# Patient Record
Sex: FEMALE | Race: OTHER | HISPANIC OR LATINO
[De-identification: names, ages, dates, MRNs, and addresses within clinical notes are randomized per-mention and may not be internally consistent; named-entity substitution may affect disease eponyms.]

---

## 2018-11-18 ENCOUNTER — TRANSCRIPTION ENCOUNTER (OUTPATIENT)
Age: 63
End: 2018-11-18

## 2019-09-29 ENCOUNTER — TRANSCRIPTION ENCOUNTER (OUTPATIENT)
Age: 64
End: 2019-09-29

## 2020-04-26 ENCOUNTER — MESSAGE (OUTPATIENT)
Age: 65
End: 2020-04-26

## 2020-05-12 ENCOUNTER — APPOINTMENT (OUTPATIENT)
Dept: DISASTER EMERGENCY | Facility: HOSPITAL | Age: 65
End: 2020-05-12

## 2020-05-12 LAB
SARS-COV-2 IGG SERPL IA-ACNC: 1.4 INDEX
SARS-COV-2 IGG SERPL QL IA: POSITIVE

## 2020-11-06 ENCOUNTER — TRANSCRIPTION ENCOUNTER (OUTPATIENT)
Age: 65
End: 2020-11-06

## 2021-08-16 ENCOUNTER — TRANSCRIPTION ENCOUNTER (OUTPATIENT)
Age: 66
End: 2021-08-16

## 2021-11-04 ENCOUNTER — TRANSCRIPTION ENCOUNTER (OUTPATIENT)
Age: 66
End: 2021-11-04

## 2022-03-21 PROBLEM — Z00.00 ENCOUNTER FOR PREVENTIVE HEALTH EXAMINATION: Status: ACTIVE | Noted: 2022-03-21

## 2022-03-22 ENCOUNTER — APPOINTMENT (OUTPATIENT)
Dept: BREAST CENTER | Facility: CLINIC | Age: 67
End: 2022-03-22
Payer: COMMERCIAL

## 2022-03-22 ENCOUNTER — APPOINTMENT (OUTPATIENT)
Dept: PLASTIC SURGERY | Facility: CLINIC | Age: 67
End: 2022-03-22
Payer: COMMERCIAL

## 2022-03-22 VITALS
SYSTOLIC BLOOD PRESSURE: 137 MMHG | BODY MASS INDEX: 30.23 KG/M2 | DIASTOLIC BLOOD PRESSURE: 85 MMHG | WEIGHT: 154 LBS | HEIGHT: 60 IN | HEART RATE: 81 BPM

## 2022-03-22 DIAGNOSIS — Z78.9 OTHER SPECIFIED HEALTH STATUS: ICD-10-CM

## 2022-03-22 DIAGNOSIS — Z80.42 FAMILY HISTORY OF MALIGNANT NEOPLASM OF PROSTATE: ICD-10-CM

## 2022-03-22 DIAGNOSIS — Z80.3 FAMILY HISTORY OF MALIGNANT NEOPLASM OF BREAST: ICD-10-CM

## 2022-03-22 PROCEDURE — 99202 OFFICE O/P NEW SF 15 MIN: CPT

## 2022-03-22 PROCEDURE — 99204 OFFICE O/P NEW MOD 45 MIN: CPT

## 2022-03-22 RX ORDER — LEVOTHYROXINE SODIUM 0.09 MG/1
88 TABLET ORAL
Refills: 0 | Status: ACTIVE | COMMUNITY

## 2022-03-22 RX ORDER — CYANOCOBALAMIN (VITAMIN B-12) 1000MCG/ML
VIAL (ML) INJECTION
Refills: 0 | Status: ACTIVE | COMMUNITY

## 2022-03-22 NOTE — PHYSICAL EXAM
[de-identified] : In the lateral portion of the left great toe small portion of nail has been removed and the skin shows a linear vertically oriented incision with 2 yellow subcutaneous nodules.  No popliteal or inguinal adenopathy.

## 2022-03-22 NOTE — HISTORY OF PRESENT ILLNESS
[FreeTextEntry1] : 66-year-old postmenopausal woman with a family history of breast cancer presents with a left toe tumor that showed up 5 years ago and became very painful so in December she underwent a surgical excision by her podiatrist which revealed a spindle cell tumor with positive resection margins but uninvolved bone.  Patient had her pathology sent to [facility] for second opinion which revealed a EWS R1–SMA D3 rearranged fibroblastic tumor.  Patient comes in for consideration of reexcision.  Patient states it is gotten a little larger and tender since the original resection.

## 2022-03-24 NOTE — HISTORY OF PRESENT ILLNESS
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 Indicated for resection of lesion with Dr. Dyson.\par \par Exam: biopsy scar in area of lesion\par \par Reviewed options for reconstruction with tissue rearrangment and/or full thickness skin graft.\par \par Nature of surgery, its risks, benefits, alternatives, expected postoperative course, recovery and long term results were reviewed.\par Will coordinate surgery for the near future.\par

## 2022-05-02 ENCOUNTER — RESULT REVIEW (OUTPATIENT)
Age: 67
End: 2022-05-02

## 2022-05-04 ENCOUNTER — RESULT REVIEW (OUTPATIENT)
Age: 67
End: 2022-05-04

## 2022-05-05 ENCOUNTER — APPOINTMENT (OUTPATIENT)
Dept: BREAST CENTER | Facility: HOSPITAL | Age: 67
End: 2022-05-05

## 2022-05-05 ENCOUNTER — TRANSCRIPTION ENCOUNTER (OUTPATIENT)
Age: 67
End: 2022-05-05

## 2022-05-10 ENCOUNTER — APPOINTMENT (OUTPATIENT)
Dept: PLASTIC SURGERY | Facility: CLINIC | Age: 67
End: 2022-05-10
Payer: COMMERCIAL

## 2022-05-10 PROCEDURE — 99024 POSTOP FOLLOW-UP VISIT: CPT

## 2022-05-10 NOTE — HISTORY OF PRESENT ILLNESS
[FreeTextEntry1] : 65 y/o female present 5 days s/p Left big toe l Tissue Rearrangement LLE. Reports pain and has been taking Tylenol. Has been NWB and elevating her foot to reduce swelling.\par \par PE:\par Left great toe: Sutures intact, mild erythema, TTP, + soft tissue swelling, good ROM of toe. No signs of collection. \par \par A/P:\par - F/U in 1 week \par - NWB on LLE \par - Elevate foot as much as possible\par - Shower\par - Dry dressing over toe\par - Bactrim sent to pharmacy x 10 days

## 2022-05-11 ENCOUNTER — NON-APPOINTMENT (OUTPATIENT)
Age: 67
End: 2022-05-11

## 2022-05-17 ENCOUNTER — APPOINTMENT (OUTPATIENT)
Dept: PLASTIC SURGERY | Facility: CLINIC | Age: 67
End: 2022-05-17
Payer: COMMERCIAL

## 2022-05-17 PROCEDURE — 99024 POSTOP FOLLOW-UP VISIT: CPT

## 2022-05-17 NOTE — HISTORY OF PRESENT ILLNESS
[FreeTextEntry1] : 65 y/o female present 12 days s/p Left big toe Tissue Rearrangement LLE. Reports pain and has been taking Tylenol. Has been NWB and elevating her foot to reduce swelling.\par \par PE:\par Left great toe: Dissolvable sutures intact, mild erythema, TTP, + soft tissue swelling, good ROM of toe. No signs of collection. Erythema resolved.\par \par A/P:\par - F/U in 6 weeks\par - WBAT on LLE \par - Elevate foot as much as possible\par - Shower\par - Dry dressing over toe\par - Bactrim complete

## 2022-05-20 ENCOUNTER — APPOINTMENT (OUTPATIENT)
Dept: BREAST CENTER | Facility: CLINIC | Age: 67
End: 2022-05-20
Payer: COMMERCIAL

## 2022-05-20 PROCEDURE — 99024 POSTOP FOLLOW-UP VISIT: CPT

## 2022-05-23 NOTE — HISTORY OF PRESENT ILLNESS
[FreeTextEntry1] : 5/22 wide excision of left great toe fibroblastic tumor with complete excision\par \par Patient is complaining of quite a bit of pain that is burning and has been placed on antibiotics by plastic surgery although she has no fever or chills.  The area feels tight and swollen.\par \par 66-year-old postmenopausal woman with a family history of breast cancer presents with a left toe tumor that showed up 5 years ago and became very painful so in December she underwent a surgical excision by her podiatrist which revealed a spindle cell tumor with positive resection margins but uninvolved bone.  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  Patient comes in for consideration of reexcision.  Patient states it is gotten a little larger and tender since the original resection.

## 2022-05-23 NOTE — PAST MEDICAL HISTORY
[Postmenopausal] : The patient is postmenopausal [Menarche Age ____] : age at menarche was [unfilled] [History of Hormone Replacement Treatment] : has no history of hormone replacement treatment [unknown] : the patient is unsure of the date of her LMP [Total Preg ___] : G[unfilled] [Live Births ___] : P[unfilled]  [Age At Live Birth ___] : Age at live birth: [unfilled]

## 2022-05-23 NOTE — PHYSICAL EXAM
[de-identified] : Left great toe has incision intact with sutures and no signs of infection.  There are some slight erythema and swelling but no other abnormalities or drainage.

## 2022-05-25 ENCOUNTER — APPOINTMENT (OUTPATIENT)
Dept: BREAST CENTER | Facility: CLINIC | Age: 67
End: 2022-05-25
Payer: COMMERCIAL

## 2022-05-25 VITALS
DIASTOLIC BLOOD PRESSURE: 82 MMHG | WEIGHT: 154 LBS | HEIGHT: 60 IN | OXYGEN SATURATION: 100 % | HEART RATE: 81 BPM | SYSTOLIC BLOOD PRESSURE: 138 MMHG | BODY MASS INDEX: 30.23 KG/M2

## 2022-05-25 PROCEDURE — 99024 POSTOP FOLLOW-UP VISIT: CPT

## 2022-05-25 NOTE — PHYSICAL EXAM
[de-identified] : Left great toe has incision intact with sutures and no signs of infection.  There are some slight erythema and swelling has significantly improved, but no other abnormalities or drainage.

## 2022-05-25 NOTE — HISTORY OF PRESENT ILLNESS
[FreeTextEntry1] : 5/22 wide excision of left great toe fibroblastic tumor with complete excision\par \par Patient has been complaining of quite a bit of pain of the left great toe and it was placed on antibiotics but feels considerably better now that I put her on gabapentin and she has had some reduction in swelling.\par \par 66-year-old postmenopausal woman with a family history of breast cancer presents with a left toe tumor that showed up 5 years ago and became very painful so in December she underwent a surgical excision by her podiatrist which revealed a spindle cell tumor with positive resection margins but uninvolved bone.  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  Patient comes in for consideration of reexcision.  Patient states it is gotten a little larger and tender since the original resection.

## 2022-06-03 ENCOUNTER — APPOINTMENT (OUTPATIENT)
Dept: BREAST CENTER | Facility: CLINIC | Age: 67
End: 2022-06-03
Payer: COMMERCIAL

## 2022-06-03 VITALS
BODY MASS INDEX: 30.23 KG/M2 | WEIGHT: 154 LBS | HEIGHT: 60 IN | OXYGEN SATURATION: 100 % | SYSTOLIC BLOOD PRESSURE: 135 MMHG | HEART RATE: 72 BPM | DIASTOLIC BLOOD PRESSURE: 80 MMHG

## 2022-06-03 PROCEDURE — 99024 POSTOP FOLLOW-UP VISIT: CPT

## 2022-06-06 NOTE — PHYSICAL EXAM
[de-identified] : Left great toe has incision intact with sutures and no signs of infection.  There are some slight erythema and swelling has significantly improved, but no other abnormalities or drainage.

## 2022-06-06 NOTE — HISTORY OF PRESENT ILLNESS
[FreeTextEntry1] : 5/22 wide excision of left great toe fibroblastic tumor with complete excision\par \par Patient has been complaining of quite a bit of pain of the left great toe and it was placed on antibiotics but feels considerably better now that I put her on gabapentin and she has had some reduction in swelling.\par \par 66-year-old postmenopausal woman with a family history of breast cancer presents with a left toe tumor that showed up 5 years ago and became very painful so in December she underwent a surgical excision by her podiatrist which revealed a spindle cell tumor with positive resection margins but uninvolved bone.  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  Patient comes in for consideration of reexcision.  Patient states it is gotten a little larger and tender since the original resection.

## 2022-06-07 ENCOUNTER — APPOINTMENT (OUTPATIENT)
Dept: PLASTIC SURGERY | Facility: CLINIC | Age: 67
End: 2022-06-07

## 2022-06-07 PROCEDURE — 99212 OFFICE O/P EST SF 10 MIN: CPT | Mod: 24

## 2022-06-08 NOTE — HISTORY OF PRESENT ILLNESS
[FreeTextEntry1] : 67 y/o female present 5 weeks s/p Left big toe Tissue Rearrangement LLE. Reports pain under control.\par PE:\par Left great toe: Well healed, good shape of toe, + soft tissue swelling, good ROM of toe. \par \par A/P:\par - F/U in 6 months \par No activity limitations\par

## 2022-09-23 ENCOUNTER — APPOINTMENT (OUTPATIENT)
Dept: BREAST CENTER | Facility: CLINIC | Age: 67
End: 2022-09-23

## 2022-09-23 VITALS
DIASTOLIC BLOOD PRESSURE: 75 MMHG | HEART RATE: 74 BPM | WEIGHT: 160 LBS | BODY MASS INDEX: 31.25 KG/M2 | OXYGEN SATURATION: 98 % | SYSTOLIC BLOOD PRESSURE: 134 MMHG

## 2022-09-23 PROCEDURE — 99213 OFFICE O/P EST LOW 20 MIN: CPT

## 2022-09-23 NOTE — HISTORY OF PRESENT ILLNESS
[FreeTextEntry1] : 5/22 wide excision of left great toe fibroblastic tumor with complete excision\par \par Patient feels significantly better and her left great toe and denies any masses.\par \par 66-year-old postmenopausal woman with a family history of breast cancer presents with a left toe tumor that showed up 5 years ago and became very painful so in December she underwent a surgical excision by her podiatrist which revealed a spindle cell tumor with positive resection margins but uninvolved bone.  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  Patient comes in for consideration of reexcision.  Patient states it is gotten a little larger and tender since the original resection.

## 2022-10-26 ENCOUNTER — NON-APPOINTMENT (OUTPATIENT)
Age: 67
End: 2022-10-26

## 2024-05-14 DIAGNOSIS — R92.8 OTHER ABNORMAL AND INCONCLUSIVE FINDINGS ON DIAGNOSTIC IMAGING OF BREAST: ICD-10-CM

## 2024-05-17 ENCOUNTER — APPOINTMENT (OUTPATIENT)
Dept: BREAST CENTER | Facility: CLINIC | Age: 69
End: 2024-05-17
Payer: MEDICARE

## 2024-05-17 ENCOUNTER — NON-APPOINTMENT (OUTPATIENT)
Age: 69
End: 2024-05-17

## 2024-05-17 VITALS
OXYGEN SATURATION: 100 % | DIASTOLIC BLOOD PRESSURE: 83 MMHG | SYSTOLIC BLOOD PRESSURE: 143 MMHG | HEART RATE: 67 BPM | HEIGHT: 60 IN | WEIGHT: 160 LBS | BODY MASS INDEX: 31.41 KG/M2

## 2024-05-17 DIAGNOSIS — Z85.850 PERSONAL HISTORY OF MALIGNANT NEOPLASM OF THYROID: ICD-10-CM

## 2024-05-17 DIAGNOSIS — C80.1 MALIGNANT (PRIMARY) NEOPLASM, UNSPECIFIED: ICD-10-CM

## 2024-05-17 PROCEDURE — 99204 OFFICE O/P NEW MOD 45 MIN: CPT

## 2024-05-17 NOTE — CONSULT LETTER
[Dear  ___] : Dear  [unfilled], [( Thank you for referring [unfilled] for consultation for _____ )] : Thank you for referring [unfilled] for consultation for [unfilled] [Please see my note below.] : Please see my note below. [Consult Closing:] : Thank you very much for allowing me to participate in the care of this patient.  If you have any questions, please do not hesitate to contact me. [Sincerely,] : Sincerely, [FreeTextEntry3] : Mindi Estrella [place] [address] [DrSenait  ___] : Dr. CAMPBELL

## 2024-05-17 NOTE — PHYSICAL EXAM
[Normocephalic] : normocephalic [Atraumatic] : atraumatic [Supple] : supple [No Supraclavicular Adenopathy] : no supraclavicular adenopathy [Examined in the supine and seated position] : examined in the supine and seated position [Symmetrical] : symmetrical [No dominant masses] : no dominant masses in right breast  [No Nipple Retraction] : no left nipple retraction [No Nipple Discharge] : no left nipple discharge [No Axillary Lymphadenopathy] : no left axillary lymphadenopathy [No Edema] : no edema [No Rashes] : no rashes [No Ulceration] : no ulceration [de-identified] : 2N2 ellen there is a 8mm palp mass with associated post bx changes and bruising

## 2024-05-17 NOTE — ASSESSMENT
[FreeTextEntry1] : This is a 68 -year-old female with left breast atypical papilloma . I explained that it does increase her risk of breast cancer and that surgical excision is recommended to rule out any associated underlying malignant processes. We reviewed the option of observation alone. We also reviewed the procedure of localization (wire and mag-seed) and excision. We reviewed postop care and recovery. We reviewed the risks of infection, bleeding, hematoma, seroma, deformity, scarring and change of sensation particularly in the nipple with possible loss of sensation. I outlined the procedure and what she should expect on the day of surgery. She understands all of the above and her questions have been answered. She will obtain medical clearance.

## 2024-05-17 NOTE — HISTORY OF PRESENT ILLNESS
[FreeTextEntry1] : This is a 67 yo female who presents with left breast atypical papilloma.  She self palpated a breast mass in April 2024. She underwent diagnostic imaging at [facility] an US bx was recommended for left 2:00N2, suspicious 0.6 x 0.7 0.5 cm lobulated mass. 5/7/2024 pathology from this Left US core 2:00N2 (wing clip) shows atypical intraductal papilloma, multiple detached fragments.     Her previous breast history is positive for a right excisional biopsy more than 30 years ago. She does not remember the pathology. But states it was negative. She has h/o thyroid cancer and is s/p thyroidectomy and radioactive iodine. She also has h/o spindle cell lesion of her left great toe.  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   She does SBE She has noticed a change in her left breast as a breast lump. Tender with pressure. Denies any trauma, changes to herbs, diet or supplements. She has not noticed a change in her nipple or nipple area. She has not noticed a change in the skin of the breast. except post bx changes She is not experiencing nipple discharge. She is not experiencing breast pain. She has not noticed a lump or lymph node under the armpit.   BREAST CANCER RISK FACTORS Menarche: 16 Menopause: 54 Grav:   3      Para: 2 Age at first live birth: 23 Nursed:  no Hysterectomy: yes 60yo Oophorectomy: yes 52y OCP: no HRT: no Last pap/pelvic exam: 59 Related family history:  none Ashkenazi:  no Mastery risk assessment: BRCAPRO 4.7%, TCv7 7.6%, TCv8 6.9%, Awa 5.4%, Loc 3.8% Genetic testing: no Bra size:  36 B     Last mammogram: 4/29/2024   Location: [facility] Report reviewed.           Images reviewed. Results: negative The breasts are heterogeneously dense.     Last ultrasound: 4/29/2024     Location: [facility] Report reviewed.       Images reviewed. Results: B4b- Underlying area of palpable concern left 2:00 n2, suspicious 0.6 x 0.7 0.5 cm lobulated mass- US core     Last MRI:   Location: Report reviewed.     Images reviewed. Results:

## 2024-06-12 ENCOUNTER — RESULT REVIEW (OUTPATIENT)
Age: 69
End: 2024-06-12

## 2024-06-20 ENCOUNTER — RESULT REVIEW (OUTPATIENT)
Age: 69
End: 2024-06-20

## 2024-06-20 ENCOUNTER — TRANSCRIPTION ENCOUNTER (OUTPATIENT)
Age: 69
End: 2024-06-20

## 2024-06-20 ENCOUNTER — APPOINTMENT (OUTPATIENT)
Dept: BREAST CENTER | Facility: HOSPITAL | Age: 69
End: 2024-06-20

## 2024-07-02 ENCOUNTER — APPOINTMENT (OUTPATIENT)
Dept: BREAST CENTER | Facility: CLINIC | Age: 69
End: 2024-07-02
Payer: MEDICARE

## 2024-07-02 VITALS
HEIGHT: 60 IN | DIASTOLIC BLOOD PRESSURE: 57 MMHG | SYSTOLIC BLOOD PRESSURE: 135 MMHG | BODY MASS INDEX: 31.41 KG/M2 | HEART RATE: 72 BPM | OXYGEN SATURATION: 100 % | WEIGHT: 160 LBS

## 2024-07-02 DIAGNOSIS — D36.9 BENIGN NEOPLASM, UNSPECIFIED SITE: ICD-10-CM

## 2024-07-02 PROCEDURE — 99024 POSTOP FOLLOW-UP VISIT: CPT

## 2024-07-30 ENCOUNTER — NON-APPOINTMENT (OUTPATIENT)
Age: 69
End: 2024-07-30

## 2024-07-30 DIAGNOSIS — R92.2 INCONCLUSIVE MAMMOGRAM: ICD-10-CM

## 2024-07-30 DIAGNOSIS — R92.30 INCONCLUSIVE MAMMOGRAM: ICD-10-CM

## 2024-07-30 DIAGNOSIS — Z12.31 ENCOUNTER FOR SCREENING MAMMOGRAM FOR MALIGNANT NEOPLASM OF BREAST: ICD-10-CM

## 2024-08-12 ENCOUNTER — APPOINTMENT (OUTPATIENT)
Dept: BREAST CENTER | Facility: CLINIC | Age: 69
End: 2024-08-12
Payer: MEDICARE

## 2024-08-12 VITALS
HEIGHT: 60 IN | BODY MASS INDEX: 31.41 KG/M2 | DIASTOLIC BLOOD PRESSURE: 75 MMHG | SYSTOLIC BLOOD PRESSURE: 124 MMHG | HEART RATE: 66 BPM | WEIGHT: 160 LBS

## 2024-08-12 DIAGNOSIS — D36.9 BENIGN NEOPLASM, UNSPECIFIED SITE: ICD-10-CM

## 2024-08-12 PROCEDURE — 99024 POSTOP FOLLOW-UP VISIT: CPT

## 2024-08-12 NOTE — HISTORY OF PRESENT ILLNESS
[FreeTextEntry1] : This is a 67 yo female who presents with left breast atypical papilloma.  She self palpated a breast mass in April 2024. She underwent diagnostic imaging at [facility] an US bx was recommended for left 2:00N2, suspicious 0.6 x 0.7 0.5 cm lobulated mass. 5/7/2024 pathology from this Left US core 2:00N2 (wing clip) shows atypical intraductal papilloma, multiple detached fragments.     Her previous breast history is positive for a right excisional biopsy more than 30 years ago. She does not remember the pathology. But states it was negative. She has h/o thyroid cancer and is s/p thyroidectomy and radioactive iodine. She also has h/o spindle cell lesion of her left great toe.  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   She is s/p left breast 2:00 excisional biopsy 6/20/2024, final pathology shows intraductal papilloma, negative for carcinoma. She is doing well post op. She saw Dr. Looney and chemoprevention was not recommended.  She does SBE She has noticed a change in her left breast as a breast lump. Tender with pressure. Denies any trauma, changes to herbs, diet or supplements. She has not noticed a change in her nipple or nipple area. She has not noticed a change in the skin of the breast. except post bx changes She is not experiencing nipple discharge. She is not experiencing breast pain. She has not noticed a lump or lymph node under the armpit.   BREAST CANCER RISK FACTORS Menarche: 16 Menopause: 54 Grav:   3      Para: 2 Age at first live birth: 23 Nursed:  no Hysterectomy: yes 62yo Oophorectomy: yes 52y OCP: no HRT: no Last pap/pelvic exam: 61 Related family history:  none Ashkenazi:  no Mastery risk assessment: BRCAPRO 4.7%, TCv7 28.6%, TCv8 27.5%, Awa 10.2%, Loc 3.8% (with atypia) Genetic testing: no Bra size:  36 B     Last mammogram: 4/29/2024   Location: [facility] Report reviewed.           Images reviewed. Results: negative The breasts are heterogeneously dense.     Last ultrasound: 4/29/2024     Location: [facility] Report reviewed.       Images reviewed. Results: B4b- Underlying area of palpable concern left 2:00 n2, suspicious 0.6 x 0.7 0.5 cm lobulated mass- US core     Last MRI: never

## 2024-08-12 NOTE — CONSULT LETTER
[Dear  ___] : Dear  [unfilled], [Courtesy Letter:] : I had the pleasure of seeing your patient, [unfilled], in my office today. [Please see my note below.] : Please see my note below. [Consult Closing:] : Thank you very much for allowing me to participate in the care of this patient.  If you have any questions, please do not hesitate to contact me. [Sincerely,] : Sincerely, [FreeTextEntry3] : Mindi [place] [address] [DrSenait  ___] : Dr. CAMPBELL

## 2024-08-12 NOTE — ASSESSMENT
[FreeTextEntry1] : doing well plan mg/louo 4/2025 f/u after imaging She knows to call or return sooner should any concerns or questions arise.

## 2024-08-12 NOTE — CONSULT LETTER
[Dear  ___] : Dear  [unfilled], [Courtesy Letter:] : I had the pleasure of seeing your patient, [unfilled], in my office today. [Please see my note below.] : Please see my note below. [Consult Closing:] : Thank you very much for allowing me to participate in the care of this patient.  If you have any questions, please do not hesitate to contact me. [Sincerely,] : Sincerely, [FreeTextEntry3] : Mindi [place] [address] [DrSenait  ___] : Dr. CAMPBELL

## 2024-08-12 NOTE — HISTORY OF PRESENT ILLNESS
[FreeTextEntry1] : This is a 69 yo female who presents with left breast atypical papilloma.  She self palpated a breast mass in April 2024. She underwent diagnostic imaging at [facility] an US bx was recommended for left 2:00N2, suspicious 0.6 x 0.7 0.5 cm lobulated mass. 5/7/2024 pathology from this Left US core 2:00N2 (wing clip) shows atypical intraductal papilloma, multiple detached fragments.     Her previous breast history is positive for a right excisional biopsy more than 30 years ago. She does not remember the pathology. But states it was negative. She has h/o thyroid cancer and is s/p thyroidectomy and radioactive iodine. She also has h/o spindle cell lesion of her left great toe.  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   She is s/p left breast 2:00 excisional biopsy 6/20/2024, final pathology shows intraductal papilloma, negative for carcinoma. She is doing well post op. She saw Dr. Looney and chemoprevention was not recommended.  She does SBE She has noticed a change in her left breast as a breast lump. Tender with pressure. Denies any trauma, changes to herbs, diet or supplements. She has not noticed a change in her nipple or nipple area. She has not noticed a change in the skin of the breast. except post bx changes She is not experiencing nipple discharge. She is not experiencing breast pain. She has not noticed a lump or lymph node under the armpit.   BREAST CANCER RISK FACTORS Menarche: 16 Menopause: 54 Grav:   3      Para: 2 Age at first live birth: 23 Nursed:  no Hysterectomy: yes 60yo Oophorectomy: yes 52y OCP: no HRT: no Last pap/pelvic exam: 61 Related family history:  none Ashkenazi:  no Mastery risk assessment: BRCAPRO 4.7%, TCv7 28.6%, TCv8 27.5%, Awa 10.2%, Loc 3.8% (with atypia) Genetic testing: no Bra size:  36 B     Last mammogram: 4/29/2024   Location: [facility] Report reviewed.           Images reviewed. Results: negative The breasts are heterogeneously dense.     Last ultrasound: 4/29/2024     Location: [facility] Report reviewed.       Images reviewed. Results: B4b- Underlying area of palpable concern left 2:00 n2, suspicious 0.6 x 0.7 0.5 cm lobulated mass- US core     Last MRI: never

## 2025-05-06 ENCOUNTER — APPOINTMENT (OUTPATIENT)
Dept: BREAST CENTER | Facility: CLINIC | Age: 70
End: 2025-05-06